# Patient Record
Sex: FEMALE | Race: WHITE | URBAN - METROPOLITAN AREA
[De-identification: names, ages, dates, MRNs, and addresses within clinical notes are randomized per-mention and may not be internally consistent; named-entity substitution may affect disease eponyms.]

---

## 2018-04-11 ENCOUNTER — OFFICE VISIT (OUTPATIENT)
Dept: DERMATOLOGY | Age: 38
End: 2018-04-11

## 2018-04-11 DIAGNOSIS — Z86.018 HISTORY OF DYSPLASTIC NEVUS: ICD-10-CM

## 2018-04-11 DIAGNOSIS — D22.9 BENIGN NEVUS: ICD-10-CM

## 2018-04-11 DIAGNOSIS — L57.8 DIFFUSE PHOTODAMAGE OF SKIN: ICD-10-CM

## 2018-04-11 DIAGNOSIS — L57.0 KERATOSIS, ACTINIC: Primary | ICD-10-CM

## 2018-04-11 DIAGNOSIS — L82.1 SEBORRHEIC KERATOSES: ICD-10-CM

## 2018-04-11 PROCEDURE — 99214 OFFICE O/P EST MOD 30 MIN: CPT | Performed by: DERMATOLOGY

## 2018-04-11 RX ORDER — FLUOROURACIL 50 MG/ML
SOLUTION TOPICAL
Qty: 10 ML | Refills: 0 | Status: SHIPPED | OUTPATIENT
Start: 2018-04-11 | End: 2019-04-10 | Stop reason: ALTCHOICE

## 2018-05-23 ENCOUNTER — OFFICE VISIT (OUTPATIENT)
Dept: DERMATOLOGY | Age: 38
End: 2018-05-23

## 2018-05-23 DIAGNOSIS — L57.0 KERATOSIS, ACTINIC: Primary | ICD-10-CM

## 2018-05-23 PROCEDURE — 99999 PR OFFICE/OUTPT VISIT,PROCEDURE ONLY: CPT | Performed by: DERMATOLOGY

## 2018-06-15 ENCOUNTER — TELEPHONE (OUTPATIENT)
Dept: DERMATOLOGY | Age: 38
End: 2018-06-15

## 2018-09-24 ENCOUNTER — OFFICE VISIT (OUTPATIENT)
Dept: DERMATOLOGY | Age: 38
End: 2018-09-24
Payer: COMMERCIAL

## 2018-09-24 DIAGNOSIS — L57.0 KERATOSIS, ACTINIC: Primary | ICD-10-CM

## 2018-09-24 DIAGNOSIS — L71.9 ROSACEA: ICD-10-CM

## 2018-09-24 DIAGNOSIS — Z86.018 HISTORY OF DYSPLASTIC NEVUS: ICD-10-CM

## 2018-09-24 PROCEDURE — 99213 OFFICE O/P EST LOW 20 MIN: CPT | Performed by: DERMATOLOGY

## 2018-12-05 ENCOUNTER — OFFICE VISIT (OUTPATIENT)
Dept: DERMATOLOGY | Age: 38
End: 2018-12-05
Payer: COMMERCIAL

## 2018-12-05 DIAGNOSIS — D22.39 IRRITATED NEVUS OF NOSE: ICD-10-CM

## 2018-12-05 DIAGNOSIS — L71.9 ROSACEA: Primary | ICD-10-CM

## 2018-12-05 DIAGNOSIS — L71.0 PERIORAL DERMATITIS: ICD-10-CM

## 2018-12-05 PROCEDURE — 11310 SHAVE SKIN LESION 0.5 CM/<: CPT | Performed by: DERMATOLOGY

## 2018-12-05 PROCEDURE — 99213 OFFICE O/P EST LOW 20 MIN: CPT | Performed by: DERMATOLOGY

## 2018-12-05 RX ORDER — LEVOTHYROXINE SODIUM 0.03 MG/1
TABLET ORAL
COMMUNITY
Start: 2018-11-21

## 2018-12-05 NOTE — PROGRESS NOTES
dermatitis. Right nasal bridge 4 mm raised flesh-colored papule, irritated nevus              Assessment and Plan     1. Rosacea -Discussed laser and other topical treatment for background erythema after she completes pregnancy    2. Perioral dermatitis -Start MetroCream b.i.d. Reviewed proper use and side effects. Gentle soaps. 3. Irritated nevus of nose - Reviewed risk of recurrence and scar. -Verbal consent obtained after risks (infection, bleeding, scar), benefits and alternatives explained. -Area(s) to be shaved were marked with a surgical pen. Site(s) were cleansed with alcohol. Local anesthesia achieved with 1% lidocaine with epinephrine/sodium bicarbonate. Shave lesion performed with a razor blade. Hemostasis was achieved with aluminum chloride. The wound(s) were dressed with petrolatum and covered with a bandage. Specimen(s) sent to pathology. Pt educated re: risk of bleeding, infection, scar and wound care instructions.         Recheck nasal bridge 1-2 months with total body skin exam

## 2018-12-10 LAB — DERMATOLOGY PATHOLOGY REPORT: NORMAL

## 2019-04-10 ENCOUNTER — OFFICE VISIT (OUTPATIENT)
Dept: DERMATOLOGY | Age: 39
End: 2019-04-10
Payer: COMMERCIAL

## 2019-04-10 DIAGNOSIS — L90.5 SCAR: ICD-10-CM

## 2019-04-10 DIAGNOSIS — L71.0 PERIORAL DERMATITIS: Primary | ICD-10-CM

## 2019-04-10 DIAGNOSIS — L24.3 IRRITANT CONTACT DERMATITIS DUE TO COSMETICS: ICD-10-CM

## 2019-04-10 PROCEDURE — 99213 OFFICE O/P EST LOW 20 MIN: CPT | Performed by: DERMATOLOGY

## 2019-04-10 RX ORDER — TRIAMCINOLONE ACETONIDE 0.25 MG/G
OINTMENT TOPICAL
Qty: 15 G | Refills: 0 | Status: SHIPPED | OUTPATIENT
Start: 2019-04-10 | End: 2019-04-17

## 2019-04-10 NOTE — PROGRESS NOTES
Activity    Alcohol use: Yes    Drug use: Not on file    Sexual activity: Not on file   Lifestyle    Physical activity:     Days per week: Not on file     Minutes per session: Not on file    Stress: Not on file   Relationships    Social connections:     Talks on phone: Not on file     Gets together: Not on file     Attends Church service: Not on file     Active member of club or organization: Not on file     Attends meetings of clubs or organizations: Not on file     Relationship status: Not on file    Intimate partner violence:     Fear of current or ex partner: Not on file     Emotionally abused: Not on file     Physically abused: Not on file     Forced sexual activity: Not on file   Other Topics Concern    Not on file   Social History Narrative    Not on file       Physical Examination       -General: Well-appearing, NAD  1. Limited exam.  Well-healed scar nasal bridge. Multiple erythematous papules-lower eyelids, paranasal cheeks-perioral dermatitis. Background erythema. Also with a component of rosacea. Erythema of upper and lower lips extending past her vermilion with mild scale      Assessment and Plan     1. Perioral dermatitis-also a component of rosacea -is currently undergoing infertility treatments, MetroCream b.i.d. Reviewed proper use and side effects. She will notify her infertility doctor that she is using this. Avoid minocycline for now. 2. Irritant contact dermatitis due to cosmetics -triamcinolone 0.025% ointment b.i.d. for one week. Change to a simple toothpaste with only fluoride. Discussed using only Aquaphor or Vaseline and slowly adding back in her cosmetics.   If she develops recurrences-consider allergy consult

## 2020-10-26 ENCOUNTER — OFFICE VISIT (OUTPATIENT)
Dept: DERMATOLOGY | Age: 40
End: 2020-10-26
Payer: COMMERCIAL

## 2020-10-26 VITALS — TEMPERATURE: 97.9 F

## 2020-10-26 PROCEDURE — 17003 DESTRUCT PREMALG LES 2-14: CPT | Performed by: DERMATOLOGY

## 2020-10-26 PROCEDURE — G8421 BMI NOT CALCULATED: HCPCS | Performed by: DERMATOLOGY

## 2020-10-26 PROCEDURE — 17000 DESTRUCT PREMALG LESION: CPT | Performed by: DERMATOLOGY

## 2020-10-26 PROCEDURE — 99213 OFFICE O/P EST LOW 20 MIN: CPT | Performed by: DERMATOLOGY

## 2020-10-26 PROCEDURE — G8427 DOCREV CUR MEDS BY ELIG CLIN: HCPCS | Performed by: DERMATOLOGY

## 2020-10-26 PROCEDURE — G8484 FLU IMMUNIZE NO ADMIN: HCPCS | Performed by: DERMATOLOGY

## 2020-10-26 PROCEDURE — 1036F TOBACCO NON-USER: CPT | Performed by: DERMATOLOGY

## 2020-10-26 NOTE — PROGRESS NOTES
Buddhist service: Not on file     Active member of club or organization: Not on file     Attends meetings of clubs or organizations: Not on file     Relationship status: Not on file    Intimate partner violence     Fear of current or ex partner: Not on file     Emotionally abused: Not on file     Physically abused: Not on file     Forced sexual activity: Not on file   Other Topics Concern    Not on file   Social History Narrative    Not on file       Physical Examination       -General: Well-appearing, NAD  1. Normal affect. Total body skin exam including scalp, face, neck, chest, abdomen, back, bilateral upper extremities, bilateral lower extremities, ocular conjunctiva, external lips, and nails was performed. Examination normal unless stated below. Underwear area not examined. Gritty erythematous papules across her upper forehead  Scattered on the trunk and extremities are multiple well-defined round and oval symmetric smoothly-bordered uniformly brown macules and papules. Assessment and Plan     1. Keratosis, actinic -7-forehead lesion(s) treated w/ liquid nitrogen. Edu re: risk of blister formation, discomfort, scar, hypopigmentation. Discussed wound care. Consider Efudex when she is finished with pregnancy and breast-feeding   2.  Benign nevus - Benign acquired melanocytic nevi  -Recommend monthly self skin exams   -Educated regarding the ABCDEs of melanoma detection   -Call for any new/changing moles or concerning lesions  -Reviewed sun protective behavior -- sun avoidance during the peak hours of the day, sun-protective clothing (including hat and sunglasses), sunscreen use (water resistant, broad spectrum, SPF at least 30, need for reapplication every 2 to 3 hours), avoidance of tanning beds

## 2022-10-10 ENCOUNTER — OFFICE VISIT (OUTPATIENT)
Dept: DERMATOLOGY | Age: 42
End: 2022-10-10
Payer: COMMERCIAL

## 2022-10-10 DIAGNOSIS — L82.1 SEBORRHEIC KERATOSES: ICD-10-CM

## 2022-10-10 DIAGNOSIS — L57.0 ACTINIC KERATOSIS TREATED WITH TOPICAL FLUOROURACIL (5FU): Primary | ICD-10-CM

## 2022-10-10 DIAGNOSIS — D22.9 BENIGN NEVUS: ICD-10-CM

## 2022-10-10 PROCEDURE — G8421 BMI NOT CALCULATED: HCPCS | Performed by: DERMATOLOGY

## 2022-10-10 PROCEDURE — 99214 OFFICE O/P EST MOD 30 MIN: CPT | Performed by: DERMATOLOGY

## 2022-10-10 PROCEDURE — G8484 FLU IMMUNIZE NO ADMIN: HCPCS | Performed by: DERMATOLOGY

## 2022-10-10 PROCEDURE — 1036F TOBACCO NON-USER: CPT | Performed by: DERMATOLOGY

## 2022-10-10 PROCEDURE — G8427 DOCREV CUR MEDS BY ELIG CLIN: HCPCS | Performed by: DERMATOLOGY

## 2022-10-10 RX ORDER — FLUOROURACIL 50 MG/G
CREAM TOPICAL
Qty: 40 G | Refills: 0 | Status: SHIPPED | OUTPATIENT
Start: 2022-10-10

## 2022-10-10 NOTE — PROGRESS NOTES
[facility]) Dermatology  Teresa Perez M.D.  [phone]       Mykel Lombardi  1980    40 y.o. female     Date of Visit: 10/10/2022    Chief Complaint:   Chief Complaint   Patient presents with    Skin Lesion        I was asked to see this patient by Dr. Hall ref. provider found. History of Present Illness:  1. Tbse    Multiple nevi. Patient has not noticed any new or changing pigmented lesions. Stable in size, shape, color. Not itching, bleeding. Increasing number of actinic keratoses across her forehead-completed Efudex 5/18 nasal bridge and paranasal cheeks. Now is careful to wear hats and sunscreen    Denies pregnancy or intent to become pregnant. 10/22 2 girls ages 3 and 6       Skin history:  History of moderately dysplastic nevus right lower back  2/15 right mid abdomen dysplastic nevus moderate     5/18 Efudex nasal bridge and paranasal cheeks     Acne-Accutane 1997, 2001. Spironolactone until May 2015, discontinued when anticipating pregnancy       Review of Systems:  Constitutional: Reports general sense of well-being       Past Medical History, Surgical History, Family History, Medications and Allergies reviewed.     Social History:   Social History     Socioeconomic History    Marital status:      Spouse name: Not on file    Number of children: Not on file    Years of education: Not on file    Highest education level: Not on file   Occupational History    Not on file   Tobacco Use    Smoking status: Never    Smokeless tobacco: Never   Vaping Use    Vaping Use: Never used   Substance and Sexual Activity    Alcohol use: Yes    Drug use: Not on file    Sexual activity: Not on file   Other Topics Concern    Not on file   Social History Narrative    Not on file     Social Determinants of Health     Financial Resource Strain: Not on file   Food Insecurity: Not on file   Transportation Needs: Not on file   Physical Activity: Not on file   Stress: Not on file   Social Connections: Not on file   Intimate Partner Violence: Not on file   Housing Stability: Not on file       Physical Examination       -General: Well-appearing, NAD  1. Normal affect. Total body skin exam including scalp, face, neck, chest, abdomen, back, bilateral upper extremities, bilateral lower extremities, ocular conjunctiva, external lips, and nails was performed. Examination normal unless stated below. Underwear area not examined. Scattered on the trunk and extremities are multiple well-defined round and oval symmetric smoothly-bordered uniformly brown macules and papules. Scattered gritty erythematous papules across her forehead-1 more hypertrophic right upper forehead  Scattered hyperkeratotic stuck on papules left leg, back    Assessment and Plan     1. Actinic keratosis treated with topical fluorouracil (5FU)-Efudex 5% cream twice daily for 10 to 11 days prior to next appointment-reviewed side effects, contraindications, proper application. Discussed direct sun avoidance. We will plan to see her after 10 to 11 days and review need for 14 days treatment   2. Benign nevus - Benign acquired melanocytic nevi  -Recommend monthly self skin exams   -Educated regarding the ABCDEs of melanoma detection   -Call for any new/changing moles or concerning lesions  -Reviewed sun protective behavior -- sun avoidance during the peak hours of the day, sun-protective clothing (including hat and sunglasses), sunscreen use (water resistant, broad spectrum, SPF at least 30, need for reapplication every 2 to 3 hours), avoidance of tanning beds      3. Seborrheic keratoses - Discussed underlying nature of seborrheic keratosis and low risk of malignancy. Treatment reserved for lesions that are itching, bleeding, growing or otherwise becoming bothersome. Discussed monitoring for change with reevaluation for changing lesions.

## 2023-01-11 ENCOUNTER — OFFICE VISIT (OUTPATIENT)
Dept: DERMATOLOGY | Age: 43
End: 2023-01-11
Payer: COMMERCIAL

## 2023-01-11 DIAGNOSIS — L57.0 ACTINIC KERATOSIS TREATED WITH TOPICAL FLUOROURACIL (5FU): Primary | ICD-10-CM

## 2023-01-11 PROCEDURE — G8427 DOCREV CUR MEDS BY ELIG CLIN: HCPCS | Performed by: DERMATOLOGY

## 2023-01-11 PROCEDURE — G8484 FLU IMMUNIZE NO ADMIN: HCPCS | Performed by: DERMATOLOGY

## 2023-01-11 PROCEDURE — 1036F TOBACCO NON-USER: CPT | Performed by: DERMATOLOGY

## 2023-01-11 PROCEDURE — 99214 OFFICE O/P EST MOD 30 MIN: CPT | Performed by: DERMATOLOGY

## 2023-01-11 PROCEDURE — G8421 BMI NOT CALCULATED: HCPCS | Performed by: DERMATOLOGY

## 2023-01-11 NOTE — PROGRESS NOTES
[facility]) Dermatology  Tamara lAlen M.D.  [phone]       Cruzito Haskins  1980    40 y.o. female     Date of Visit: 1/11/2023    Chief Complaint:   Chief Complaint   Patient presents with    Follow-up     Efudex on forehead        I was asked to see this patient by Dr. Hall ref. provider found. History of Present Illness:  1. Patient presents today for evaluation after use of Efudex-used Efudex 5% cream twice daily for the last 10 days. Has developed multiple erythematous small crusted papules that are desquamating. Mild background erythema. No difficulty with infection or sun exposure. Minimal pain      10/22 2 girls ages 2 and 6        Skin history:  History of moderately dysplastic nevus right lower back  2/15 right mid abdomen dysplastic nevus moderate     5/18 Efudex nasal bridge and paranasal cheeks     Acne-Accutane 1997, 2001. Spironolactone until May 2015, discontinued when anticipating pregnancy       Review of Systems:  Constitutional: Reports general sense of well-being       Past Medical History, Surgical History, Family History, Medications and Allergies reviewed.     Social History:   Social History     Socioeconomic History    Marital status:      Spouse name: Not on file    Number of children: Not on file    Years of education: Not on file    Highest education level: Not on file   Occupational History    Not on file   Tobacco Use    Smoking status: Never    Smokeless tobacco: Never   Vaping Use    Vaping Use: Never used   Substance and Sexual Activity    Alcohol use: Yes    Drug use: Not on file    Sexual activity: Not on file   Other Topics Concern    Not on file   Social History Narrative    Not on file     Social Determinants of Health     Financial Resource Strain: Not on file   Food Insecurity: Not on file   Transportation Needs: Not on file   Physical Activity: Not on file   Stress: Not on file   Social Connections: Not on file   Intimate Partner Violence: Not on file Housing Stability: Not on file       Physical Examination       -General: Well-appearing, NAD  1. Mild background erythema forehead. Multiple discrete crusted erythematous papules      Assessment and Plan     1. Actinic keratosis treated with topical fluorouracil (5FU)-continue Efudex 5% cream twice daily for 2 more days. Discontinue Saturday and start Aquaphor. Reviewed side effects, contraindications, proper application-reviewed expectations regarding healing. Discussed postinflammatory erythema.   Recheck at total-body skin exam

## 2024-06-24 ENCOUNTER — TELEPHONE (OUTPATIENT)
Dept: DERMATOLOGY | Age: 44
End: 2024-06-24

## 2024-06-24 NOTE — TELEPHONE ENCOUNTER
Patient called for appointment for a spot on her forehead that needs checked.  When could the doctor fit her in?   [phone]

## 2024-06-27 ENCOUNTER — OFFICE VISIT (OUTPATIENT)
Dept: DERMATOLOGY | Age: 44
End: 2024-06-27

## 2024-06-27 DIAGNOSIS — L57.8 DIFFUSE PHOTODAMAGE OF SKIN: ICD-10-CM

## 2024-06-27 DIAGNOSIS — L57.0 AK (ACTINIC KERATOSIS): Primary | ICD-10-CM

## 2024-06-27 NOTE — PROGRESS NOTES
[facility] Dermatology  Julee Aaron M.D.  [phone]       Thais Lovell  1980    44 y.o. female     Date of Visit: 6/27/2024    Chief Complaint:   Chief Complaint   Patient presents with    Skin Lesion        I was asked to see this patient by Dr. Hall ref. provider found.    History of Present Illness:  1. New gritty pink papule right upper forehead-dry and mildly tender.  Not itching, bleeding, growing.  Completed fluorouracil 11/23 to her forehead.    Photodamage.  Progressive freckling and lentigines of sun exposed areas.  Patient is wearing hats and sunscreen consistently.     10/22 2 girls ages 2 and 6        Skin history:  History of moderately dysplastic nevus right lower back  2/15 right mid abdomen dysplastic nevus moderate     5/18 Efudex nasal bridge and paranasal cheeks  11/23 fluorouracil forehead     Acne-Accutane 1997, 2001.  Spironolactone until May 2015, discontinued when anticipating pregnancy    Review of Systems:  Constitutional: Reports general sense of well-being       Past Medical History, Surgical History, Family History, Medications and Allergies reviewed.    Social History:   Social History     Socioeconomic History    Marital status:      Spouse name: Not on file    Number of children: Not on file    Years of education: Not on file    Highest education level: Not on file   Occupational History    Not on file   Tobacco Use    Smoking status: Never    Smokeless tobacco: Never   Vaping Use    Vaping Use: Never used   Substance and Sexual Activity    Alcohol use: Yes    Drug use: Not on file    Sexual activity: Not on file   Other Topics Concern    Not on file   Social History Narrative    Not on file     Social Determinants of Health     Financial Resource Strain: Not on file   Food Insecurity: Not on file   Transportation Needs: Not on file   Physical Activity: Not on file   Stress: Not on file   Social Connections: Not on file   Intimate Partner Violence: Not on file

## 2024-10-16 ENCOUNTER — OFFICE VISIT (OUTPATIENT)
Dept: DERMATOLOGY | Age: 44
End: 2024-10-16
Payer: COMMERCIAL

## 2024-10-16 DIAGNOSIS — D22.9 BENIGN NEVUS: ICD-10-CM

## 2024-10-16 DIAGNOSIS — L57.0 ACTINIC KERATOSIS TREATED WITH TOPICAL FLUOROURACIL (5FU): Primary | ICD-10-CM

## 2024-10-16 PROCEDURE — 99214 OFFICE O/P EST MOD 30 MIN: CPT | Performed by: DERMATOLOGY

## 2024-10-16 RX ORDER — SPIRONOLACTONE 100 MG/1
TABLET, FILM COATED ORAL
COMMUNITY
Start: 2024-10-10

## 2024-10-16 NOTE — PROGRESS NOTES
[facility] Dermatology  Julee Aaron M.D.  [phone]       Thais Lovell  1980    44 y.o. female     Date of Visit: 10/16/2024    Chief Complaint:   Chief Complaint   Patient presents with    Skin Lesion        I was asked to see this patient by Dr. Hall ref. provider found.    History of Present Illness:  1. TBSE    Multiple nevi. Patient has not noticed any new or changing pigmented lesions.   Stable in size, shape, color. Not itching, bleeding.  More erythematous papule right thigh-present for many years, may have traumatized this recently after shaving.    Actinic keratoses forehead, nasal dorsum.  Slowly increasing in size and number.  Last completed fluorouracil field therapy 1/23.  No discrete lesion growing rapidly or becoming painful        10/22 2 girls ages 2 and 6        Skin history:  History of moderately dysplastic nevus right lower back  2/15 right mid abdomen dysplastic nevus moderate     5/18 Efudex nasal bridge and paranasal cheeks  11/23 fluorouracil forehead     Acne-Accutane 1997, 2001.  Spironolactone until May 2015, discontinued when anticipating pregnancy.  Primary care provider restarted spironolactone at 100 mg daily 9/24    Review of Systems:  Constitutional: Reports general sense of well-being       Past Medical History, Surgical History, Family History, Medications and Allergies reviewed.    Social History:   Social History     Socioeconomic History    Marital status:      Spouse name: Not on file    Number of children: Not on file    Years of education: Not on file    Highest education level: Not on file   Occupational History    Not on file   Tobacco Use    Smoking status: Never    Smokeless tobacco: Never   Vaping Use    Vaping status: Never Used   Substance and Sexual Activity    Alcohol use: Yes    Drug use: Not on file    Sexual activity: Not on file   Other Topics Concern    Not on file   Social History Narrative    Not on file     Social Determinants of Health

## 2024-10-29 ENCOUNTER — PATIENT MESSAGE (OUTPATIENT)
Dept: DERMATOLOGY | Age: 44
End: 2024-10-29

## 2024-10-29 RX ORDER — FLUOROURACIL 50 MG/G
CREAM TOPICAL
Qty: 40 G | Refills: 0 | Status: SHIPPED | OUTPATIENT
Start: 2024-10-29

## 2024-11-07 ENCOUNTER — TELEPHONE (OUTPATIENT)
Dept: DERMATOLOGY | Age: 44
End: 2024-11-07

## 2024-11-07 NOTE — TELEPHONE ENCOUNTER
Patient wants to schedule an appt to have a bump removed from right leg.  Dr Aaron has seen this bump and was told to call back to set up appt for this.  Please call [phone]

## 2024-12-12 ENCOUNTER — PROCEDURE VISIT (OUTPATIENT)
Dept: DERMATOLOGY | Age: 44
End: 2024-12-12
Payer: COMMERCIAL

## 2024-12-12 DIAGNOSIS — L57.8 DIFFUSE PHOTODAMAGE OF SKIN: ICD-10-CM

## 2024-12-12 DIAGNOSIS — D22.9 BENIGN NEVUS: ICD-10-CM

## 2024-12-12 DIAGNOSIS — D48.5 NEOPLASM OF UNCERTAIN BEHAVIOR OF SKIN: Primary | ICD-10-CM

## 2024-12-12 DIAGNOSIS — L57.0 ACTINIC KERATOSIS TREATED WITH TOPICAL FLUOROURACIL (5FU): ICD-10-CM

## 2024-12-12 PROCEDURE — 11102 TANGNTL BX SKIN SINGLE LES: CPT | Performed by: DERMATOLOGY

## 2024-12-12 PROCEDURE — 99214 OFFICE O/P EST MOD 30 MIN: CPT | Performed by: DERMATOLOGY

## 2024-12-12 NOTE — PROGRESS NOTES
[facility] Dermatology  Julee Aaron M.D.  [phone]       Thais Lovell  1980    44 y.o. female     Date of Visit: 12/12/2024    Chief Complaint:   Chief Complaint   Patient presents with    Skin Lesion        I was asked to see this patient by Dr. Hall ref. provider found.    History of Present Illness:  1.  Patient presents today for evaluation of a nevus on her right distal lateral thigh-lesion became erythematous, may have been irritated.    Scattered other benign nevi on her legs.  No other lesions becoming irritated    Irritated nevus left back beneath her bra strap-sometimes becomes pruritic and she excoriates.    Efudex-on day 3 of fluorouracil-has been applying forehead, temples, cheeks, nose.  No erythema yet.  Tolerating well.       10/22 2 girls ages 2 and 6        Skin history:  History of moderately dysplastic nevus right lower back  2/15 right mid abdomen dysplastic nevus moderate     5/18 Efudex nasal bridge and paranasal cheeks  11/23 fluorouracil forehead     Acne-Accutane 1997, 2001.  Spironolactone until May 2015, discontinued when anticipating pregnancy.  Primary care provider restarted spironolactone at 100 mg daily 9/24       Review of Systems:  Constitutional: Reports general sense of well-being       Past Medical History, Surgical History, Family History, Medications and Allergies reviewed.    Social History:   Social History     Socioeconomic History    Marital status:      Spouse name: Not on file    Number of children: Not on file    Years of education: Not on file    Highest education level: Not on file   Occupational History    Not on file   Tobacco Use    Smoking status: Never    Smokeless tobacco: Never   Vaping Use    Vaping status: Never Used   Substance and Sexual Activity    Alcohol use: Yes    Drug use: Not on file    Sexual activity: Not on file   Other Topics Concern    Not on file   Social History Narrative    Not on file     Social Determinants of Health

## 2024-12-17 LAB — DERMATOLOGY PATHOLOGY REPORT: NORMAL
